# Patient Record
Sex: FEMALE | URBAN - METROPOLITAN AREA
[De-identification: names, ages, dates, MRNs, and addresses within clinical notes are randomized per-mention and may not be internally consistent; named-entity substitution may affect disease eponyms.]

---

## 2018-05-10 ENCOUNTER — APPOINTMENT (RX ONLY)
Dept: URBAN - METROPOLITAN AREA CLINIC 23 | Facility: CLINIC | Age: 68
Setting detail: DERMATOLOGY
End: 2018-05-10

## 2018-05-10 DIAGNOSIS — Z41.9 ENCOUNTER FOR PROCEDURE FOR PURPOSES OTHER THAN REMEDYING HEALTH STATE, UNSPECIFIED: ICD-10-CM

## 2018-05-10 PROCEDURE — ? FILLERS

## 2018-05-10 PROCEDURE — ? DYSPORT

## 2018-05-10 NOTE — PROCEDURE: FILLERS
Nasolabial Folds Filler Volume In Cc: 0
Map Statment: See [address] for Complete Details
Price (Use Numbers Only, No Special Characters Or $): 0.00
Anesthesia Type: 1% lidocaine without epinephrine
Filler: Darwin Hook
Expiration Date (Month Year): 08/04/2019
Lot #: [id number]
Additional Area 1 Volume In Cc: 0.5
Detail Level: Zone
Expiration Date (Month Year): 02/28/2020
Additional Anesthesia Volume In Cc: 6
Additional Area 1 Location: lateral mouth, perioral fini lines, marionette lines
Consent: Written consent obtained. Risks include but not limited to bruising, beading, irregular texture, ulceration, infection, allergic reaction, scar formation, incomplete augmentation, temporary nature, procedural pain.
Lot #: [id number]
Additional Area 1 Location: perioral fine lines, lateral mouth
Anesthesia Volume In Cc: 0.2
Use Map Statement For Sites (Optional): No
Post-Care Instructions: Patient instructed to apply ice to reduce swelling.

## 2018-05-10 NOTE — PROCEDURE: DYSPORT
Additional Area 4 Location: high forehead
Additional Area 6 Units: 0
Dilution (U/ 0.1cc): 1.5
Topical Anesthesia?: BLT cream (benzocaine 20%, lidocaine 6%, tetracaine 4%)
Additional Area 6 Location: kita bolton
Additional Area 2 Units: 10
Price (Use Numbers Only, No Special Characters Or $): 0.00
Additional Area 3 Location: lateral brows
Lot #: [id number]
Additional Area 1 Units: 48
Consent: Written consent obtained. Risks include but not limited to lid/brow ptosis, bruising, swelling, diplopia, temporary effect, incomplete chemical denervation.
Expiration Date (Month Year): 10/31/2018
Additional Area 1 Location: glabella
Detail Level: Zone

## 2018-10-02 ENCOUNTER — APPOINTMENT (RX ONLY)
Dept: URBAN - METROPOLITAN AREA CLINIC 23 | Facility: CLINIC | Age: 68
Setting detail: DERMATOLOGY
End: 2018-10-02

## 2018-10-02 DIAGNOSIS — Z41.9 ENCOUNTER FOR PROCEDURE FOR PURPOSES OTHER THAN REMEDYING HEALTH STATE, UNSPECIFIED: ICD-10-CM

## 2018-10-02 PROCEDURE — ? PULSED-DYE LASER

## 2018-10-02 PROCEDURE — ? BOTOX

## 2018-10-02 PROCEDURE — ? FILLERS

## 2018-10-02 NOTE — PROCEDURE: PULSED-DYE LASER
Treated Area: small area
Cryogen Time (Ms): 10
Pulse Duration: 10 ms
Consent: Written consent obtained, risks reviewed including but not limited to crusting, scabbing, blistering, scarring, darker or lighter pigmentary change, incidental hair removal, bruising, and/or incomplete removal.
Cryogen Time (Ms): 70719 [address] Brown
Cryogen Time (Ms): 30
Detail Level: Zone
Spot Size: 7 mm
Post-Procedure Care: Sunscreen applied. Post care reviewed with patient.
Delay Time (Ms): [address] [facility]
Spot Size: 10x3 mm
Laser Type: Vbeam 595nm
Fluence In J/Cm2 (Optional): 6.50
Delay Time (Ms): 20
Spot Size: 10 mm
Fluence In J/Cm2 (Optional): 10.0
Location Override: perioral
Pulse Duration: 6 ms
Spot Size: 5 mm
Post-Care Instructions: I reviewed with the patient in detail post-care instructions. Patient should stay away from the sun and wear sun protection until treated areas are fully healed.
Pulse Duration: 1.5 ms
Price (Use Numbers Only, No Special Characters Or $): 0.00
Pulse Duration: 3 ms
Treated Area: medium area

## 2018-10-02 NOTE — PROCEDURE: BOTOX
Additional Area 1 Units: [address]
Nasal Root Units: 0
Dilution (U/0.1 Cc): 2.5
Expiration Date (Month Year): 12/20
Additional Area 2 Location: lateral brows
Additional Area 1 Location: glabella ( special)
Topical Anesthesia?: 3% lidocaine, 6% prilocaine
Additional Area 4 Location: high forehead
Detail Level: Zone
Consent: Written consent obtained. Risks include but not limited to lid/brow ptosis, bruising, swelling, diplopia, temporary effect, incomplete chemical denervation.
Price (Use Numbers Only, No Special Characters Or $): 0.00
Additional Area 3 Units: 6
Additional Area 3 Location: chin
Additional Area 6 Location: Forehead
Length Of Topical Anesthesia Application (Optional): 15 minutes
Lot #: [id number]
Additional Area 2 Units: 2

## 2018-10-02 NOTE — PROCEDURE: FILLERS
Marionette Lines Filler  Volume In Cc: 0
Additional Area 5 Location: NLFs,tear trough  and lips
Post-Care Instructions: Patient instructed to apply ice to reduce swelling.
Include Cannula Brand?: DermaSculpt
Additional Area 4 Location: lat brow using an acannula ,jawline and NLFs
Price (Use Numbers Only, No Special Characters Or $): 0.00
Include Cannula Information In Note?: No
Lot #: [id number]
Map Statment: See [address] for Complete Details
Filler: Juvederm Ultra XC
Consent: Written consent obtained. Risks include but not limited to bruising, beading, irregular texture, ulceration, infection, allergic reaction, scar formation, incomplete augmentation, temporary nature, procedural pain.
Additional Area 3 Location: lateral eyes, nasal root
Additional Area 1 Location: lateral mouth, fine lines perioral, marionette lines, vermillion lips
Anesthesia Type: 1% lidocaine with epinephrine
Additional Anesthesia Volume In Cc: 6
Additional Area 2 Location: diluted injected fine lines lat brows and fine lines cheeks
Additional Area 1 Volume In Cc: 1
Lot #: [id number]
Expiration Date (Month Year): 10/05/2019
Anesthesia Volume In Cc: 0.2
Additional Area 2 Location: cheeks,jawline and Marionette
Additional Area 1 Location: Lateral cheeks, lateral jawline, marionette lines, lateral mouth
Additional Area 1 Location: lateral mouth, marionette lines, NLF's
Additional Area 2 Location: lat jawline,Marionette and mouth area
Expiration Date (Month Year): 07/31/19
Lot #: [id number]
Detail Level: Zone

## 2019-02-27 ENCOUNTER — APPOINTMENT (RX ONLY)
Dept: URBAN - METROPOLITAN AREA CLINIC 23 | Facility: CLINIC | Age: 69
Setting detail: DERMATOLOGY
End: 2019-02-27

## 2019-02-27 DIAGNOSIS — Z41.9 ENCOUNTER FOR PROCEDURE FOR PURPOSES OTHER THAN REMEDYING HEALTH STATE, UNSPECIFIED: ICD-10-CM

## 2019-02-27 PROCEDURE — ? FILLERS

## 2019-02-27 PROCEDURE — ? DYSPORT

## 2019-02-27 NOTE — PROCEDURE: DYSPORT
Additional Area 4 Location: neck
Additional Area 4 Units: 0
Lot #: [id number]
Dilution (U/ 0.1cc): 1.5
Price (Use Numbers Only, No Special Characters Or $): 0.00
Detail Level: Simple
Additional Area 1 Location: glabella
Additional Area 6 Location: high forehead 2 cm above brows
Additional Area 2 Location: lateral brow
Additional Area 2 Units: 10
Glabellar Complex Units: 48
Expiration Date (Month Year): 08/31/2019
Additional Area 3 Location: bunny lines
Consent: Written consent obtained. Risks include but not limited to lid/brow ptosis, bruising, swelling, diplopia, temporary effect, incomplete chemical denervation.

## 2019-02-27 NOTE — PROCEDURE: FILLERS
Nasolabial Folds Filler Volume In Cc: 0
Additional Area 2 Location: oral commissures.  Restylane diluted with 1% Lidocaine, injected vermillion lips fine lines
Lot #: [id number]
Include Cannula Length?: 1.5 inch
Filler: Juvederm Ultra XC
Lot #: [id number]
Expiration Date (Month Year): 09/30/2021
Additional Area 3 Location: Lateral Jawline, Lateral cheeks, oral commissures and Marionette
Additional Area 3 Volume In Cc: 1
Include Cannula Information In Note?: No
Expiration Date (Month Year): 08/31/2019
Include Cannula Brand?: DermaSculpt
Additional Area 4 Location: Vermillion lip fines lines
Additional Area 1 Location: lateral mouth, marionette lines, perioral fine lines, glabellar fine lines
Additional Area 5 Location: Lips, NLFs, lateral Cheeks
Detail Level: Zone
Additional Area 2 Location: vermillion fine lines, nasalabial folds,oral commissure
Price (Use Numbers Only, No Special Characters Or $): 0.00
Anesthesia Type: 1% lidocaine without epinephrine
Lot #: [id number]
Expiration Date (Month Year): 10/29/2019
Additional Area 1 Location: lateral mouth, fine lines perioral, marionette lines, lateral cheeks, vermillion lips
Additional Area 3 Location: Glabbellar fine lines, Nasalabial folds, Marionette lines, vermillion lip
Additional Area 2 Location: Nasalabial, Glabellar fine lines- Complimentary
Additional Area 4 Location: Lateral Cheeks
Additional Anesthesia Volume In Cc: 6
Map Statment: See [address] for Complete Details
Consent: Written consent obtained. Risks include but not limited to bruising, beading, irregular texture, ulceration, infection, allergic reaction, scar formation, incomplete augmentation, temporary nature, procedural pain.
Additional Area 1 Location: lateral jawlines, lateral cheeks, lips
Include Cannula Size?: 25G
Post-Care Instructions: Patient instructed to apply ice to reduce swelling.

## 2019-02-27 NOTE — PROCEDURE: MIPS QUALITY
Quality 474: Zoster Vaccination Status: Shingrix Vaccination not Administered or Previously Received, Reason not Otherwise Specified
Quality 110: Preventive Care And Screening: Influenza Immunization: Influenza immunization was not ordered or administered, reason not given
Detail Level: Zone
Quality 130: Documentation Of Current Medications In The Medical Record: Current Medications Documented

## 2019-07-30 ENCOUNTER — APPOINTMENT (RX ONLY)
Dept: URBAN - METROPOLITAN AREA CLINIC 23 | Facility: CLINIC | Age: 69
Setting detail: DERMATOLOGY
End: 2019-07-30

## 2019-07-30 DIAGNOSIS — Z41.9 ENCOUNTER FOR PROCEDURE FOR PURPOSES OTHER THAN REMEDYING HEALTH STATE, UNSPECIFIED: ICD-10-CM

## 2019-07-30 PROCEDURE — ? FILLERS

## 2019-07-30 PROCEDURE — ? DYSPORT

## 2019-07-30 NOTE — PROCEDURE: DYSPORT
Periorbital Skin Units: 0
Topical Anesthesia?: 20% benzocaine, 8% lidocaine, and 8% tetracaine
Additional Area 1 Location: glabella
Lot #: [id number]
Additional Area 2 Units: 10
Expiration Date (Month Year): 1/31/2020
Detail Level: Simple
Price (Use Numbers Only, No Special Characters Or $): 0.00
Additional Area 4 Location: 2cm high forehead
Consent: Written consent obtained. Risks include but not limited to lid/brow ptosis, bruising, swelling, diplopia, temporary effect, incomplete chemical denervation.
Additional Area 1 Units: 48
Dilution (U/ 0.1cc): 1.5
Additional Area 2 Location: lateral brows
Length Of Topical Anesthesia Application (Optional): 15 minutes
Additional Area 3 Location: high forehead 3 cm above brows

## 2019-12-12 ENCOUNTER — APPOINTMENT (RX ONLY)
Dept: URBAN - METROPOLITAN AREA CLINIC 23 | Facility: CLINIC | Age: 69
Setting detail: DERMATOLOGY
End: 2019-12-12

## 2019-12-12 DIAGNOSIS — Z41.9 ENCOUNTER FOR PROCEDURE FOR PURPOSES OTHER THAN REMEDYING HEALTH STATE, UNSPECIFIED: ICD-10-CM

## 2019-12-12 PROCEDURE — ? DYSPORT

## 2019-12-12 PROCEDURE — ? FILLERS

## 2019-12-12 NOTE — PROCEDURE: FILLERS
Cheeks Filler Volume In Cc: 0
Price (Use Numbers Only, No Special Characters Or $): 0.00
Additional Area 5 Location: Cheeks, vermillion lips, marionette fine lines, glabellar fine lines, lateral mouth
Consent: Written consent obtained. Risks include but not limited to bruising, beading, irregular texture, ulceration, infection, allergic reaction, scar formation, incomplete augmentation, temporary nature, procedural pain.
Use Map Statement For Sites (Optional): No
Lot #: [id number]
Lot #: [id number]
Post-Care Instructions: Patient instructed to apply ice to reduce swelling.
Map Statment: See [address] for Complete Details
Expiration Date (Month Year): 09/2021
Expiration Date (Month Year): 2020-10-14
Anesthesia Type: 1% lidocaine without epinephrine
Include Cannula Brand?: DermaSculpt
Lot #: [id number]
Include Cannula Size?: 25G
Expiration Date (Month Year): 08/31/2019
Include Cannula Length?: 1.5 inch
Additional Area 1 Location: cheeks, lateral mouth, lateral jawlines , nlfls
Additional Anesthesia Volume In Cc: 6
Additional Area 1 Location: lateral mouth, perioral fine lines, vermillion lips, marionette lines
Additional Area 1 Volume In Cc: 1
Additional Area 1 Location: lateral mouth, fine lines perioral, marionette lines, lateral cheeks, vermillion lips
Additional Area 2 Location: Lips, oral commissure, glabellar fine fines
Additional Area 2 Location: cheeks, jawline, oral commissure
Additional Area 2 Location: Nasalabial, Glabellar fine lines- Complimentary
Filler: Juvederm Ultra XC
Additional Area 3 Location: Glabbellar fine lines, Nasalabial folds, Marionette lines, vermillion lip
Detail Level: Zone
Additional Area 4 Location: Perioral
Additional Area 4 Location: lateral jawline, lateral mouth, glabella lines,

## 2019-12-12 NOTE — PROCEDURE: DYSPORT
Glabellar Complex Units: 48
Forehead Units: 0
Price (Use Numbers Only, No Special Characters Or $): 0.00
Additional Area 3 Location: high forehead 3 cm above brows
Additional Area 4 Location: 2cm high forehead
Additional Area 2 Location: glabella
Detail Level: Simple
Lot #: [id number]
Additional Area 1 Location: lateral brows
Additional Area 1 Units: 10
Dilution (U/ 0.1cc): 1.5
Consent: Written consent obtained. Risks include but not limited to lid/brow ptosis, bruising, swelling, diplopia, temporary effect, incomplete chemical denervation.
Expiration Date (Month Year): 6/30/20

## 2020-06-23 ENCOUNTER — RX ONLY (OUTPATIENT)
Age: 70
Setting detail: RX ONLY
End: 2020-06-23

## 2020-06-23 RX ORDER — TRETIONIN 0.5 MG/G
CREAM TOPICAL
Qty: 1 | Refills: 3 | Status: ERX | COMMUNITY
Start: 2020-06-23

## 2021-03-16 ENCOUNTER — APPOINTMENT (RX ONLY)
Dept: URBAN - METROPOLITAN AREA CLINIC 23 | Facility: CLINIC | Age: 71
Setting detail: DERMATOLOGY
End: 2021-03-16

## 2021-03-16 VITALS — TEMPERATURE: 97.3 F

## 2021-03-16 DIAGNOSIS — Z41.9 ENCOUNTER FOR PROCEDURE FOR PURPOSES OTHER THAN REMEDYING HEALTH STATE, UNSPECIFIED: ICD-10-CM

## 2021-03-16 DIAGNOSIS — L72.8 OTHER FOLLICULAR CYSTS OF THE SKIN AND SUBCUTANEOUS TISSUE: ICD-10-CM

## 2021-03-16 PROCEDURE — ? RECOMMENDATIONS

## 2021-03-16 PROCEDURE — ? INCISION AND DRAINAGE

## 2021-03-16 PROCEDURE — ? FILLERS

## 2021-03-16 PROCEDURE — ? IN-HOUSE DISPENSING PHARMACY

## 2021-03-16 PROCEDURE — ? DYSPORT

## 2021-03-16 PROCEDURE — 10060 I&D ABSCESS SIMPLE/SINGLE: CPT

## 2021-03-16 ASSESSMENT — LOCATION ZONE DERM: LOCATION ZONE: TRUNK

## 2021-03-16 ASSESSMENT — LOCATION DETAILED DESCRIPTION DERM: LOCATION DETAILED: PERIUMBILICAL SKIN

## 2021-03-16 ASSESSMENT — LOCATION SIMPLE DESCRIPTION DERM: LOCATION SIMPLE: ABDOMEN

## 2021-03-16 NOTE — PROCEDURE: DYSPORT
Show Glabellar Units: Yes
Show Mentalis Units: No
Left Periorbital Units: 0
Additional Area 5 Location: glabella
Dilution (U/ 0.1cc): 1.5
Additional Area 3 Location: high forehead 3 cm above brows
Detail Level: Simple
Glabellar Complex Units: [address] [facility]
Consent: Written consent obtained. Risks include but not limited to lid/brow ptosis, bruising, swelling, diplopia, temporary effect, incomplete chemical denervation.
Additional Area 1 Location: high forehead
Additional Area 2 Location: crows feet
Expiration Date (Month Year): 10-31-21
Additional Area 4 Location: 2cm high forehead
Lot #: [id number]
Price (Use Numbers Only, No Special Characters Or $): 0.00

## 2021-03-16 NOTE — PROCEDURE: IN-HOUSE DISPENSING PHARMACY
Product 15 Units Dispensed: 0
Product 4 Application Directions: Take one tablet half hour today prior to procedure as indicated.  Dispense in office
Product 19 Unit Type: mg
Product 2 Price/Unit (In Dollars): 0.00
Product 22 Application Directions: Apply to the affected area of the face
Product 8 Amount/Unit (Numbers Only): 1
Product 10 Unit Type: bottle(s)
Detail Level: Zone
Send Charges To Patient Encounter: No
Name Of Product 11: Clearing gel
Product 4 Unit Type: tablets
Product 2 Amount/Unit (Numbers Only): 6
Product 6 Application Directions: Take one tablet for swelling today at the office as indicated and one tablet tomorrow
Name Of Product 7: Latisse 5ml
Product 11 Application Directions: Apply a thin layer to the acne prone areas in the AM
Name Of Product 5: loratadine 10 mg
Product 9 Application Directions: Apply to the acne prone areas in the Am and Pm
Product 9 Unit Type: jar(s)
Product 3 Application Directions: Apply a pea size amount to the entire face at night
Product 21 Application Directions: Take one tablet 30 minutes prior to procedure
Product 7 Amount/Unit (Numbers Only): 5
Name Of Product 10: Valtrex 500 mg
Product 14 Application Directions: Apply to the under eyes in the Am and pm
Product 3 Unit Type: grams
Name Of Product 19: Diazepam 5mg
Name Of Product 22: Brightening Pads
Product 5 Application Directions: Take one tablet today after procedure with full glass of water as indicated
Product 14 Unit Type: tube(s)
Name Of Product 4: Valium 5 mg
Name Of Product 6: Prednisone 20mg
Product 10 Application Directions: Take 1 tablet 2 times daily for 3 days
Name Of Product 13: Skin Better Even tone correcting serum
Product 8 Application Directions: Apply to the clean face in the AM and PM daily
Product 2 Application Directions: apply to affected areas left lower leg am and pm  as indicated
Name Of Product 9: Clearing tone pads
Product 13 Application Directions: Apply to the entire face in the Am and pm
Name Of Product 21: Valium 5mg
Name Of Product 3: Tretinoin 0.05% cream
Name Of Product 14: Skin Better Interfuse eye treatment cream
Name Of Product 1: Hydroquinone 6% pads
Name Of Product 12: Retinol Lite
Product 3 Amount/Unit (Numbers Only): [address] [facility]
Product 7 Application Directions: Apply to lashes at bedtime daily as indicated
Product 7 Unit Type: ml
Product 1 Application Directions: Apply thin layer to right cheek every night  at bedtime only.
Name Of Product 8: TNS Essential
Product 12 Application Directions: Apply a pea size amount to the entire face at bedtime.
Name Of Product 2: Valtrex

## 2021-03-16 NOTE — PROCEDURE: FILLERS
Include Cannula Brand?: DermaSculpt
Decollete Filler  Volume In Cc: 0
Expiration Date (Month Year): 2022-4-3
Include Cannula Information In Note?: No
Anesthesia Type: 1% lidocaine without epinephrine
Expiration Date (Month Year): 08/31/2019
Expiration Date (Month Year): 09/2021
Lot #: [id number]
Include Cannula Size?: 25G
Lot #: [id number]
Additional Anesthesia Volume In Cc: 6
Additional Area 1 Location: cheeks, lateral mouth, nlfs , glabella
Include Cannula Length?: 1.5 inch
Additional Area 1 Volume In Cc: 1
Additional Area 2 Location: cheeks, jawline, oral commissure
Additional Area 4 Location: Perioral
Filler: Juvederm Ultra XC
Detail Level: Zone
Additional Area 3 Location: Glabbellar fine lines, Nasalabial folds, Marionette lines, vermillion lip
Additional Area 2 Location: Nasalabial, Glabellar fine lines- Complimentary
Additional Area 4 Location: lateral jawline, lateral mouth, glabella lines,
Price (Use Numbers Only, No Special Characters Or $): 0.00
Additional Area 2 Location: Lips, oral commissure, glabellar fine fines
Additional Area 1 Location: lateral mouth, fine lines perioral, marionette lines, lateral cheeks, vermillion lips
Consent: Written consent obtained. Risks include but not limited to bruising, beading, irregular texture, ulceration, infection, allergic reaction, scar formation, incomplete augmentation, temporary nature, procedural pain.
Additional Area 5 Location: Cheeks, vermillion lips, marionette fine lines, glabellar fine lines, lateral mouth
Post-Care Instructions: Patient instructed to apply ice to reduce swelling.
Additional Area 1 Location: lateral mouth, perioral fine lines, vermillion lips, marionette lines
Map Statment: See [address] for Complete Details
Lot #: [id number]

## 2021-03-16 NOTE — PROCEDURE: RECOMMENDATIONS
Detail Level: Detailed
Recommendations (Free Text): Recommended \\nSculptra quoted $900
Render Risk Assessment In Note?: no
Recommendation Preamble: The following recommendations were made during the visit:

## 2021-03-19 ENCOUNTER — RX ONLY (OUTPATIENT)
Age: 71
Setting detail: RX ONLY
End: 2021-03-19

## 2021-03-19 RX ORDER — TRETIONIN 0.5 MG/G
QD CREAM TOPICAL
Qty: 20 | Refills: 3 | Status: ERX

## 2021-03-22 ENCOUNTER — RX ONLY (OUTPATIENT)
Age: 71
Setting detail: RX ONLY
End: 2021-03-22

## 2021-03-22 RX ORDER — TRETIONIN 1 MG/G
QD CREAM TOPICAL
Qty: 20 | Refills: 3 | Status: ERX | COMMUNITY
Start: 2021-03-22

## 2021-07-13 ENCOUNTER — APPOINTMENT (RX ONLY)
Dept: URBAN - METROPOLITAN AREA CLINIC 23 | Facility: CLINIC | Age: 71
Setting detail: DERMATOLOGY
End: 2021-07-13

## 2021-07-13 DIAGNOSIS — Z41.9 ENCOUNTER FOR PROCEDURE FOR PURPOSES OTHER THAN REMEDYING HEALTH STATE, UNSPECIFIED: ICD-10-CM

## 2021-07-13 PROCEDURE — ? FILLERS

## 2021-07-13 NOTE — PROCEDURE: FILLERS
Additional Area 1 Volume In Cc: 0
Tear Troughs Filler  Volume In Cc: 1
Consent: Written consent obtained. Risks include but not limited to bruising, beading, irregular texture, ulceration, infection, allergic reaction, scar formation, incomplete augmentation, temporary nature, procedural pain.
Expiration Date (Month Year): 2023-11
Post-Care Instructions: Patient instructed to apply ice to reduce swelling.
Anesthesia Type: 1% lidocaine without epinephrine
Additional Area 4 Location: Perioral
Include Cannula Information In Note?: No
Lot #: [id number]
Include Cannula Size?: 25G
Lot #: [id number]
Additional Anesthesia Volume In Cc: 6
Additional Area 1 Location: cheeks, lateral mouth, nlfs , glabella
Include Cannula Length?: 1.5 inch
Expiration Date (Month Year): 08/31/2019
Additional Area 2 Location: cheeks, jawline, oral commissure
Include Cannula Brand?: DermaSculpt
Filler: Restylane-L
Detail Level: Zone
Additional Area 4 Location: lateral jawline, lateral mouth, glabella lines,
Additional Area 1 Location: tear trough,lat mouth and oral commesure
Price (Use Numbers Only, No Special Characters Or $): 0.00
Additional Area 1 Location: lateral mouth, fine lines perioral, marionette lines, lateral cheeks, vermillion lips
Additional Area 2 Location: Lips, oral commissure, glabellar fine fines
Additional Area 5 Location: Cheeks, vermillion lips, marionette fine lines, glabellar fine lines, lateral mouth
Additional Area 2 Location: Nasalabial, Glabellar fine lines- Complimentary
Map Statment: See [address] for Complete Details
Lot #: [id number]
Additional Area 3 Location: Glabbellar fine lines, Nasalabial folds, Marionette lines, vermillion lip

## 2021-11-30 ENCOUNTER — APPOINTMENT (RX ONLY)
Dept: URBAN - METROPOLITAN AREA CLINIC 23 | Facility: CLINIC | Age: 71
Setting detail: DERMATOLOGY
End: 2021-11-30

## 2021-11-30 DIAGNOSIS — Z41.9 ENCOUNTER FOR PROCEDURE FOR PURPOSES OTHER THAN REMEDYING HEALTH STATE, UNSPECIFIED: ICD-10-CM

## 2021-11-30 PROCEDURE — ? FILLERS

## 2021-11-30 PROCEDURE — ? ADDITIONAL NOTES

## 2021-11-30 PROCEDURE — ? DYSPORT

## 2021-11-30 NOTE — PROCEDURE: DYSPORT
Price (Use Numbers Only, No Special Characters Or $): 0.00
Anterior Platysmal Bands Units: 0
Show Additional Area 5: Yes
Lot #: [id number]
Show Lcl Units: No
Additional Area 2 Location: crows feet
Additional Area 5 Location: glabella
Additional Area 3 Location: high forehead 3 cm above brows
Additional Area 1 Location: lat brows
Glabellar Complex Units: [address]
Dilution (U/ 0.1cc): 1.5
Consent: Written consent obtained. Risks include but not limited to lid/brow ptosis, bruising, swelling, diplopia, temporary effect, incomplete chemical denervation.
Expiration Date (Month Year): 2022-08
Additional Area 4 Location: 2cm high forehead
Detail Level: Simple
Additional Area 1 Units: 10

## 2021-11-30 NOTE — PROCEDURE: ADDITIONAL NOTES
Render Risk Assessment In Note?: no
Additional Notes: Hyfracated Irritated sk on the right marionette line
Detail Level: Detailed

## 2021-11-30 NOTE — PROCEDURE: FILLERS
Marionette Lines Filler  Volume In Cc: 0
Additional Area 2 Location: cheeks, jawline, oral commissure
Include Cannula Information In Note?: No
Expiration Date (Month Year): 08/31/2019
Include Cannula Brand?: DermaSculpt
Additional Area 2 Volume In Cc: 1
Filler: Jc Huertas
Detail Level: Zone
Price (Use Numbers Only, No Special Characters Or $): 0.00
Additional Area 4 Location: lateral jawline, lateral mouth, glabella lines,
Additional Area 1 Location: lateral mouth, perioral fine lines, vermillion lips, marionette lines
Additional Area 1 Location: lateral mouth, fine lines perioral, marionette lines, lateral cheeks, vermillion lips
Additional Area 2 Location: Lips, oral commissure, glabellar fine fines
Additional Area 5 Location: Cheeks, vermillion lips, marionette fine lines, glabellar fine lines, lateral mouth
Additional Area 3 Location: Glabbellar fine lines, Nasalabial folds, Marionette lines, vermillion lip
Lot #: [id number]
Filler: Restylane-L
Map Statment: See [address] for Complete Details
Additional Area 2 Location: Nasalabial, Glabellar fine lines- Complimentary
Additional Area 4 Location: Perioral
Consent: Written consent obtained. Risks include but not limited to bruising, beading, irregular texture, ulceration, infection, allergic reaction, scar formation, incomplete augmentation, temporary nature, procedural pain.
Expiration Date (Month Year): 2024-04
Anesthesia Type: 1% lidocaine without epinephrine
Post-Care Instructions: Patient instructed to apply ice to reduce swelling.
Vermilion Lips Filler Volume In Cc: 0.5
Lot #: [id number]
Expiration Date (Month Year): 2024-01
Include Cannula Size?: 25G
Additional Anesthesia Volume In Cc: 6
Lot #: [id number]
Include Cannula Length?: 1.5 inch
Additional Area 1 Location: cheeks, lateral mouth, nlfs , glabella

## 2022-07-20 ENCOUNTER — APPOINTMENT (RX ONLY)
Dept: URBAN - METROPOLITAN AREA CLINIC 23 | Facility: CLINIC | Age: 72
Setting detail: DERMATOLOGY
End: 2022-07-20

## 2022-07-20 DIAGNOSIS — Z41.9 ENCOUNTER FOR PROCEDURE FOR PURPOSES OTHER THAN REMEDYING HEALTH STATE, UNSPECIFIED: ICD-10-CM

## 2022-07-20 PROCEDURE — ? PULSED-DYE LASER

## 2022-07-20 PROCEDURE — ? FILLERS

## 2022-07-20 PROCEDURE — ? DYSPORT

## 2022-07-20 ASSESSMENT — LOCATION DETAILED DESCRIPTION DERM
LOCATION DETAILED: RIGHT UPPER CUTANEOUS LIP
LOCATION DETAILED: RIGHT CENTRAL BUCCAL CHEEK
LOCATION DETAILED: LEFT SUPERIOR LATERAL MALAR CHEEK
LOCATION DETAILED: RIGHT INFERIOR CENTRAL MALAR CHEEK
LOCATION DETAILED: LEFT INFERIOR CENTRAL MALAR CHEEK
LOCATION DETAILED: RIGHT SUPERIOR LATERAL MALAR CHEEK
LOCATION DETAILED: LEFT CENTRAL BUCCAL CHEEK

## 2022-07-20 ASSESSMENT — LOCATION ZONE DERM
LOCATION ZONE: FACE
LOCATION ZONE: LIP

## 2022-07-20 ASSESSMENT — LOCATION SIMPLE DESCRIPTION DERM
LOCATION SIMPLE: LEFT CHEEK
LOCATION SIMPLE: RIGHT LIP
LOCATION SIMPLE: RIGHT CHEEK

## 2022-07-20 NOTE — PROCEDURE: DYSPORT
Additional Area 5 Location: under eyes
Show Nasal Units: Yes
L Brow Units: 0
Consent: Written consent obtained. Risks include but not limited to lid/brow ptosis, bruising, swelling, diplopia, temporary effect, incomplete chemical denervation.
Additional Area 2 Location: lateral brows
Dilution (U/ 0.1cc): 1.5
Show Right And Left Brow Units: No
Expiration Date (Month Year): 2022-10
Periorbital Skin Units: 15
Post-Care Instructions: Patient instructed to not lie down for 4 hours, limit physical activity for 24 hours and avoid manipulation of the treated areas.
Additional Area 3 Location: lateral eyes
Additional Area 6 Location: kita bolton
Detail Level: Zone
Lot #: [id number]
Forehead Units: [id number] [address]
Additional Area 4 Location: chin
Additional Area 1 Location: glabella
Show Inventory Tab: Show

## 2022-07-20 NOTE — PROCEDURE: FILLERS
Additional Area 4 Volume In Cc: 0
Number Of Syringes (Required For Inventory): 1
Filler: Restylane Contour
Additional Area 3 Location: lateral jawline, lateral cheeks.
Map Statment: See [address] for Complete Details
Include Cannula Information In Note?: No
Additional Area 4 Location: jawline,lateral cheeks, NSF
Inventory Information: This plan will send filler information to inventory based on the fillers you select. Multiple fillers can be sent but you must ensure you select the appropriate fillers in the inventory plan.
Detail Level: Detailed
Anesthesia Type: 1% lidocaine with epinephrine
Additional Area 1 Location: lips, vermillion lips, lateral mouth, marionette lines
Show Inventory Tab: Show
Anesthesia Volume In Cc: 0.5
Filler: Restylane-L
Additional Area 3 Location: jawline,fine lines , marionette lines.
Post-Care Instructions: Patient instructed to apply ice to reduce swelling.
Consent: Written consent obtained. Risks include but not limited to bruising, beading, irregular texture, ulceration, infection, allergic reaction, scar formation, incomplete augmentation, temporary nature, procedural pain.
Additional Anesthesia Volume In Cc: 6
Additional Area 1 Location: tear trough
Additional Area 2 Location: [facility]

## 2022-07-20 NOTE — PROCEDURE: PULSED-DYE LASER
Cryogen Time (Ms): 22329 Kenneth Brown
Treated Area: small area
Spot Size: 7 mm
Delay Time (Ms): 4[address] [facility], [place], operated by [facility]
Laser Type: Vbeam 595nm
Pulse Duration: 10 ms
Spot Size: 10x3 mm
Delay Time (Ms): 20
Consent: Written consent obtained, risks reviewed including but not limited to crusting, scabbing, blistering, scarring, darker or lighter pigmentary change, incidental hair removal, bruising, and/or incomplete removal.
Detail Level: Detailed
Spot Size: 10 mm
Post-Care Instructions: I reviewed with the patient in detail post-care instructions. Patient should stay away from the sun and wear sun protection until treated areas are fully healed.
Pulse Duration: 40 ms
Location Override: post cosmetic face NC
Fluence In J/Cm2 (Optional): 11:50
Post-Procedure Care: Vaseline and ice applied. Post care reviewed with patient.
Cryogen Time (Ms): 30
Price (Use Numbers Only, No Special Characters Or $): 0.00
Cryogen Time (Ms): 27
Fluence In J/Cm2 (Optional): 6.50
Pulse Duration: 6 ms

## 2022-12-06 NOTE — PROCEDURE: FILLERS
Anesthesia Volume In Cc: 0
Include Cannula Size?: 25G
Additional Area 1 Location: lateral mouth, perioral fine lines, vermillion lips, marionette lines
Additional Area 1 Location: cheeks
Include Cannula Length?: 1.5 inch
Additional Area 1 Location: lateral mouth, fine lines perioral, marionette lines, lateral cheeks, vermillion lips
Additional Anesthesia Volume In Cc: 6
Consent: Written consent obtained. Risks include but not limited to bruising, beading, irregular texture, ulceration, infection, allergic reaction, scar formation, incomplete augmentation, temporary nature, procedural pain.
Detail Level: Zone
Additional Area 2 Location: Nasalabial, Glabellar fine lines- Complimentary
Post-Care Instructions: Patient instructed to apply ice to reduce swelling.
Additional Area 2 Location: Lips, oral commissure, glabellar fine fines
Additional Area 2 Location: cheeks, lateral jawline, marionette lines, lateral mouth.
Price (Use Numbers Only, No Special Characters Or $): 0.00
Additional Area 3 Location: Glabbellar fine lines, Nasalabial folds, Marionette lines, vermillion lip
Additional Area 2 Volume In Cc: 1
Use Map Statement For Sites (Optional): No
Filler: Juvederm Ultra XC
Additional Area 4 Location: Perioral
Additional Area 4 Location: cheeks, lateral jawline, medial cheeks fine lines
Map Statment: See [address] for Complete Details
Additional Area 5 Location: Cheeks, vermillion lips, marionette fine lines, glabellar fine lines, lateral mouth
Lot #: [id number]
Lot #: [id number]
Lot #: [id number]
Expiration Date (Month Year): 2019-10-29
None
Expiration Date (Month Year): 09/2021
Expiration Date (Month Year): 08/31/2019
Anesthesia Type: 1% lidocaine without epinephrine
Include Cannula Brand?: DermaSculpt